# Patient Record
Sex: FEMALE | Race: WHITE | NOT HISPANIC OR LATINO | Employment: UNEMPLOYED | URBAN - METROPOLITAN AREA
[De-identification: names, ages, dates, MRNs, and addresses within clinical notes are randomized per-mention and may not be internally consistent; named-entity substitution may affect disease eponyms.]

---

## 2021-02-04 ENCOUNTER — OFFICE VISIT (OUTPATIENT)
Dept: OBSTETRICS AND GYNECOLOGY | Facility: CLINIC | Age: 51
End: 2021-02-04
Payer: COMMERCIAL

## 2021-02-04 VITALS
SYSTOLIC BLOOD PRESSURE: 118 MMHG | DIASTOLIC BLOOD PRESSURE: 64 MMHG | HEIGHT: 62 IN | WEIGHT: 190.19 LBS | BODY MASS INDEX: 35 KG/M2

## 2021-02-04 DIAGNOSIS — Z87.2 HISTORY OF CYST OF BREAST: ICD-10-CM

## 2021-02-04 DIAGNOSIS — Z01.419 ENCOUNTER FOR ANNUAL ROUTINE GYNECOLOGICAL EXAMINATION: Primary | ICD-10-CM

## 2021-02-04 DIAGNOSIS — N95.1 PERIMENOPAUSAL: ICD-10-CM

## 2021-02-04 DIAGNOSIS — Z12.31 BREAST CANCER SCREENING BY MAMMOGRAM: ICD-10-CM

## 2021-02-04 DIAGNOSIS — Z87.42 HISTORY OF OVARIAN CYST: ICD-10-CM

## 2021-02-04 DIAGNOSIS — Z12.11 COLON CANCER SCREENING: ICD-10-CM

## 2021-02-04 PROBLEM — E11.9 TYPE 2 DIABETES MELLITUS: Status: ACTIVE | Noted: 2021-02-04

## 2021-02-04 PROBLEM — I47.10 SUPRAVENTRICULAR TACHYCARDIA: Status: RESOLVED | Noted: 2021-02-04 | Resolved: 2021-02-04

## 2021-02-04 PROBLEM — E78.2 MIXED HYPERLIPIDEMIA: Status: ACTIVE | Noted: 2021-02-04

## 2021-02-04 PROBLEM — I05.9 MITRAL VALVE DISEASE: Status: ACTIVE | Noted: 2021-02-04

## 2021-02-04 PROBLEM — I10 ESSENTIAL HYPERTENSION: Status: ACTIVE | Noted: 2021-02-04

## 2021-02-04 PROBLEM — E03.9 HYPOTHYROIDISM: Status: ACTIVE | Noted: 2021-02-04

## 2021-02-04 PROBLEM — D64.9 ANEMIA: Status: ACTIVE | Noted: 2021-02-04

## 2021-02-04 PROBLEM — R63.8 INCREASED BODY MASS INDEX (BMI): Status: ACTIVE | Noted: 2021-02-04

## 2021-02-04 PROBLEM — I47.10 SUPRAVENTRICULAR TACHYCARDIA: Status: ACTIVE | Noted: 2021-02-04

## 2021-02-04 PROCEDURE — 88175 CYTOPATH C/V AUTO FLUID REDO: CPT

## 2021-02-04 PROCEDURE — 99999 PR PBB SHADOW E&M-NEW PATIENT-LVL III: ICD-10-PCS | Mod: PBBFAC,,, | Performed by: OBSTETRICS & GYNECOLOGY

## 2021-02-04 PROCEDURE — 1126F PR PAIN SEVERITY QUANTIFIED, NO PAIN PRESENT: ICD-10-PCS | Mod: S$GLB,,, | Performed by: OBSTETRICS & GYNECOLOGY

## 2021-02-04 PROCEDURE — 1126F AMNT PAIN NOTED NONE PRSNT: CPT | Mod: S$GLB,,, | Performed by: OBSTETRICS & GYNECOLOGY

## 2021-02-04 PROCEDURE — 99386 PREV VISIT NEW AGE 40-64: CPT | Mod: S$GLB,,, | Performed by: OBSTETRICS & GYNECOLOGY

## 2021-02-04 PROCEDURE — 99999 PR PBB SHADOW E&M-NEW PATIENT-LVL III: CPT | Mod: PBBFAC,,, | Performed by: OBSTETRICS & GYNECOLOGY

## 2021-02-04 PROCEDURE — 99386 PR PREVENTIVE VISIT,NEW,40-64: ICD-10-PCS | Mod: S$GLB,,, | Performed by: OBSTETRICS & GYNECOLOGY

## 2021-02-04 PROCEDURE — 3008F BODY MASS INDEX DOCD: CPT | Mod: CPTII,S$GLB,, | Performed by: OBSTETRICS & GYNECOLOGY

## 2021-02-04 PROCEDURE — 87624 HPV HI-RISK TYP POOLED RSLT: CPT

## 2021-02-04 PROCEDURE — 3008F PR BODY MASS INDEX (BMI) DOCUMENTED: ICD-10-PCS | Mod: CPTII,S$GLB,, | Performed by: OBSTETRICS & GYNECOLOGY

## 2021-02-04 RX ORDER — METOPROLOL SUCCINATE 100 MG/1
100 TABLET, EXTENDED RELEASE ORAL DAILY
COMMUNITY
Start: 2020-11-12

## 2021-02-04 RX ORDER — IRBESARTAN 300 MG/1
TABLET ORAL
COMMUNITY
Start: 2021-01-31

## 2021-02-04 RX ORDER — ATORVASTATIN CALCIUM 10 MG/1
10 TABLET, FILM COATED ORAL NIGHTLY
COMMUNITY
Start: 2020-11-12

## 2021-02-04 RX ORDER — AMLODIPINE BESYLATE 2.5 MG/1
2.5 TABLET ORAL DAILY
COMMUNITY
Start: 2020-11-24

## 2021-02-04 RX ORDER — METFORMIN HYDROCHLORIDE 500 MG/1
1000 TABLET ORAL 2 TIMES DAILY
COMMUNITY
Start: 2020-11-12

## 2021-02-04 RX ORDER — LEVOTHYROXINE SODIUM 25 UG/1
TABLET ORAL
COMMUNITY
Start: 2020-12-17

## 2021-02-04 RX ORDER — METOPROLOL SUCCINATE 50 MG/1
TABLET, EXTENDED RELEASE ORAL
COMMUNITY
Start: 2021-02-01

## 2021-02-04 RX ORDER — GLIPIZIDE 5 MG/1
5 TABLET, FILM COATED, EXTENDED RELEASE ORAL 2 TIMES DAILY
COMMUNITY
Start: 2020-11-12

## 2021-02-04 RX ORDER — IRON POLYSACCHARIDE COMPLEX 150 MG
1 CAPSULE ORAL DAILY
COMMUNITY
Start: 2020-11-04

## 2021-02-09 ENCOUNTER — HOSPITAL ENCOUNTER (OUTPATIENT)
Dept: RADIOLOGY | Facility: HOSPITAL | Age: 51
Discharge: HOME | End: 2021-02-09
Attending: OBSTETRICS & GYNECOLOGY
Payer: COMMERCIAL

## 2021-02-09 DIAGNOSIS — Z87.2 HISTORY OF CYST OF BREAST: ICD-10-CM

## 2021-02-09 DIAGNOSIS — Z12.31 BREAST CANCER SCREENING BY MAMMOGRAM: ICD-10-CM

## 2021-02-09 PROCEDURE — 76642 ULTRASOUND BREAST LIMITED: CPT | Mod: TC,50

## 2021-02-09 PROCEDURE — 77066 DX MAMMO INCL CAD BI: CPT | Mod: 26,,, | Performed by: RADIOLOGY

## 2021-02-09 PROCEDURE — 77062 BREAST TOMOSYNTHESIS BI: CPT | Mod: TC

## 2021-02-09 PROCEDURE — 77066 MAMMO DIGITAL DIAGNOSTIC BILAT WITH TOMO: ICD-10-PCS | Mod: 26,,, | Performed by: RADIOLOGY

## 2021-02-09 PROCEDURE — 77062 BREAST TOMOSYNTHESIS BI: CPT | Mod: 26,,, | Performed by: RADIOLOGY

## 2021-02-09 PROCEDURE — 76642 ULTRASOUND BREAST LIMITED: CPT | Mod: 26,,, | Performed by: RADIOLOGY

## 2021-02-09 PROCEDURE — 76642 US BREAST BILATERAL LIMITED: ICD-10-PCS | Mod: 26,,, | Performed by: RADIOLOGY

## 2021-02-09 PROCEDURE — 77062 MAMMO DIGITAL DIAGNOSTIC BILAT WITH TOMO: ICD-10-PCS | Mod: 26,,, | Performed by: RADIOLOGY

## 2021-02-10 LAB
HPV HR 12 DNA SPEC QL NAA+PROBE: NEGATIVE
HPV16 AG SPEC QL: NEGATIVE
HPV18 DNA SPEC QL NAA+PROBE: NEGATIVE

## 2021-03-02 LAB
FINAL PATHOLOGIC DIAGNOSIS: NORMAL
Lab: NORMAL

## 2021-03-03 LAB — NONINV COLON CA DNA+OCC BLD SCRN STL QL: NEGATIVE

## 2021-04-14 ENCOUNTER — TELEPHONE (OUTPATIENT)
Dept: OBSTETRICS AND GYNECOLOGY | Facility: CLINIC | Age: 51
End: 2021-04-14

## 2022-01-12 ENCOUNTER — LAB VISIT (OUTPATIENT)
Dept: PRIMARY CARE CLINIC | Facility: OTHER | Age: 52
End: 2022-01-12
Attending: INTERNAL MEDICINE
Payer: COMMERCIAL

## 2022-01-12 DIAGNOSIS — U07.1 COVID-19: Primary | ICD-10-CM

## 2022-01-12 LAB
CTP QC/QA: YES
SARS-COV-2 AG RESP QL IA.RAPID: NEGATIVE

## 2022-01-12 PROCEDURE — 87811 SARS-COV-2 COVID19 W/OPTIC: CPT

## 2022-01-12 NOTE — PROGRESS NOTES
Nasal specimen collected.   BinaxNOW test performed in the presence of patient and results loaded into EPIC. Pt instructed with following instructions:.  Instructions for Patients with Confirmed or Suspected COVID-19    If you are awaiting your test result, you will either be called or it will be released to the patient portal.  If you have any questions about your test, please visit [email or web address] or call our COVID-19 information line at [phone].      Please isolate yourself at home.  You may leave home and/or return to work once the following conditions are met:    If you were not hospitalized and are not severely immunocompromised*:   More than 10 days since symptoms first appeared AND   More than 24 hours fever free without medications AND   Symptoms have improved     If you were hospitalized OR are severely immunocompromised*:   More than 20 days since symptoms first appeared   More than 24 hours fever free without medications   Symptoms have improved    If you had no symptoms but tested positive:   More than 10 days since the date of the first positive test (20 days if severely immunocompromised).   If you develop symptoms, then use the guidelines above.     *Definition of severely immunocompromised:  - Current chemotherapy for cancer  - Untreated HIV with CD4 count less than 200  - Combined primary immunodeficiency disorder  - Prednisone more than 20 mg per day for more than 14 days  - Post-transplant patients

## 2022-03-25 ENCOUNTER — OFFICE VISIT (OUTPATIENT)
Dept: OBSTETRICS AND GYNECOLOGY | Facility: CLINIC | Age: 52
End: 2022-03-25
Payer: COMMERCIAL

## 2022-03-25 VITALS
BODY MASS INDEX: 35.41 KG/M2 | WEIGHT: 192.44 LBS | HEIGHT: 62 IN | SYSTOLIC BLOOD PRESSURE: 132 MMHG | DIASTOLIC BLOOD PRESSURE: 78 MMHG

## 2022-03-25 DIAGNOSIS — Z12.31 BREAST CANCER SCREENING BY MAMMOGRAM: ICD-10-CM

## 2022-03-25 DIAGNOSIS — D64.9 ANEMIA, UNSPECIFIED TYPE: ICD-10-CM

## 2022-03-25 DIAGNOSIS — Z01.419 ENCOUNTER FOR ANNUAL ROUTINE GYNECOLOGICAL EXAMINATION: Primary | ICD-10-CM

## 2022-03-25 DIAGNOSIS — Z87.898 HISTORY OF ABNORMAL MAMMOGRAM: ICD-10-CM

## 2022-03-25 DIAGNOSIS — N76.1 SUBACUTE VAGINITIS: ICD-10-CM

## 2022-03-25 PROCEDURE — 3078F PR MOST RECENT DIASTOLIC BLOOD PRESSURE < 80 MM HG: ICD-10-PCS | Mod: CPTII,S$GLB,, | Performed by: OBSTETRICS & GYNECOLOGY

## 2022-03-25 PROCEDURE — 99999 PR PBB SHADOW E&M-EST. PATIENT-LVL IV: ICD-10-PCS | Mod: PBBFAC,,, | Performed by: OBSTETRICS & GYNECOLOGY

## 2022-03-25 PROCEDURE — 99999 PR PBB SHADOW E&M-EST. PATIENT-LVL IV: CPT | Mod: PBBFAC,,, | Performed by: OBSTETRICS & GYNECOLOGY

## 2022-03-25 PROCEDURE — 4010F ACE/ARB THERAPY RXD/TAKEN: CPT | Mod: CPTII,S$GLB,, | Performed by: OBSTETRICS & GYNECOLOGY

## 2022-03-25 PROCEDURE — 4010F PR ACE/ARB THEARPY RXD/TAKEN: ICD-10-PCS | Mod: CPTII,S$GLB,, | Performed by: OBSTETRICS & GYNECOLOGY

## 2022-03-25 PROCEDURE — 87481 CANDIDA DNA AMP PROBE: CPT | Mod: 59 | Performed by: OBSTETRICS & GYNECOLOGY

## 2022-03-25 PROCEDURE — 3008F PR BODY MASS INDEX (BMI) DOCUMENTED: ICD-10-PCS | Mod: CPTII,S$GLB,, | Performed by: OBSTETRICS & GYNECOLOGY

## 2022-03-25 PROCEDURE — 3078F DIAST BP <80 MM HG: CPT | Mod: CPTII,S$GLB,, | Performed by: OBSTETRICS & GYNECOLOGY

## 2022-03-25 PROCEDURE — 1159F MED LIST DOCD IN RCRD: CPT | Mod: CPTII,S$GLB,, | Performed by: OBSTETRICS & GYNECOLOGY

## 2022-03-25 PROCEDURE — 3075F SYST BP GE 130 - 139MM HG: CPT | Mod: CPTII,S$GLB,, | Performed by: OBSTETRICS & GYNECOLOGY

## 2022-03-25 PROCEDURE — 3008F BODY MASS INDEX DOCD: CPT | Mod: CPTII,S$GLB,, | Performed by: OBSTETRICS & GYNECOLOGY

## 2022-03-25 PROCEDURE — 1159F PR MEDICATION LIST DOCUMENTED IN MEDICAL RECORD: ICD-10-PCS | Mod: CPTII,S$GLB,, | Performed by: OBSTETRICS & GYNECOLOGY

## 2022-03-25 PROCEDURE — 99396 PREV VISIT EST AGE 40-64: CPT | Mod: S$GLB,,, | Performed by: OBSTETRICS & GYNECOLOGY

## 2022-03-25 PROCEDURE — 3075F PR MOST RECENT SYSTOLIC BLOOD PRESS GE 130-139MM HG: ICD-10-PCS | Mod: CPTII,S$GLB,, | Performed by: OBSTETRICS & GYNECOLOGY

## 2022-03-25 PROCEDURE — 87801 DETECT AGNT MULT DNA AMPLI: CPT | Performed by: OBSTETRICS & GYNECOLOGY

## 2022-03-25 PROCEDURE — 99396 PR PREVENTIVE VISIT,EST,40-64: ICD-10-PCS | Mod: S$GLB,,, | Performed by: OBSTETRICS & GYNECOLOGY

## 2022-03-25 RX ORDER — FLUCONAZOLE 150 MG/1
TABLET ORAL
COMMUNITY
Start: 2022-03-16 | End: 2022-05-04

## 2022-03-25 RX ORDER — GLIPIZIDE 5 MG/1
5 TABLET, FILM COATED, EXTENDED RELEASE ORAL
COMMUNITY
Start: 2021-04-27

## 2022-03-25 RX ORDER — METRONIDAZOLE 500 MG/1
500 TABLET ORAL 2 TIMES DAILY
COMMUNITY
Start: 2022-02-10 | End: 2022-05-04

## 2022-03-25 RX ORDER — CELECOXIB 200 MG/1
200 CAPSULE ORAL DAILY
COMMUNITY
Start: 2022-03-01

## 2022-03-25 NOTE — PATIENT INSTRUCTIONS
Please check out the [employer] PATIENT WEBSITE.  The site has education materials, patient stories, expert views, and a portal for you to ask questions.       [email or web address]      As always, please let me know if you have any questions.     Dr. Anita Moran

## 2022-03-25 NOTE — PROGRESS NOTES
Chief Complaint: Well Woman Exam     HPI:      Alcira Craft is a 51 y.o.  who presents today for well woman exam.  LMP: Patient's last menstrual period was 2021.  No issues, problems, or complaints. Specifically, patient denies abnormal vaginal bleeding, discharge, pelvic pain, urinary problems, or changes in appetite.    Does report history of recurrent yeast infections over the past few months. Ms. Craft is currently sexually active with a single male partner. She is currently using bilateral tubal ligation for contraception. She declines STD screening today.    Previous Pap:  no abnormalities (3/2/2021)  Previous Mammogram:    Impression:     1.  Oval hypoechoic circumscribed mass in the RIGHT breast at the 10:00 position which is probably benign.  Recommend short term follow-up in 12 months when the patient will be due for annual imaging.     2.  Focal asymmetry in the lower inner central LEFT breast which is probably benign.  Recommend short term follow-up in 12 months when the patient will be due for annual imaging.     BI-RADS Category:   Overall: 3 - Probably Benign     Recommendation:  Short interval follow-up is recommended in 12 Months.        Your estimated lifetime risk of breast cancer (to age 85) based on Tyrer-Cuzick risk assessment model is Tyrer-Cuzick: 8.15 %. According to the American Cancer Society, patients with a lifetime breast cancer risk of 20% or higher might benefit from supplemental screening tests.                   Exam Ended: 21 13:20 Last Resulted: 21 14:42             Most Recent Dexa: n/a  Colon cancer screening: Cologuard  negative    COVID: Completed + booster  Flu: Allergic  Gardasil:has never had     Patient Active Problem List   Diagnosis    Essential hypertension    Anemia    Hypothyroidism    Increased body mass index (BMI)    Mitral valve disease    Mixed hyperlipidemia    Type 2 diabetes mellitus       Past Medical History:  "  Diagnosis Date    Anemia     Diabetes mellitus     Hyperlipidemia     Hypertension     Supraventricular tachycardia 2021       Past Surgical History:   Procedure Laterality Date     SECTION      x3    LAPAROSCOPIC GASTRIC BANDING      27 yrs ago        OB History        3    Para   3    Term   3            AB        Living   3       SAB        IAB        Ectopic        Multiple        Live Births   3                 ROS:     Review of Systems   Constitutional: Negative for activity change, fatigue and unexpected weight change.   Respiratory: Negative for shortness of breath.    Cardiovascular: Negative for chest pain.   Gastrointestinal: Negative for abdominal pain, blood in stool, constipation and diarrhea.   Endocrine: Negative for cold intolerance and heat intolerance.   Genitourinary: Negative for bladder incontinence, dyspareunia, dysuria, frequency, hot flashes, vaginal bleeding and vaginal dryness.   Integumentary:  Negative for breast mass, breast discharge and breast tenderness.   Psychiatric/Behavioral: Negative for dysphoric mood. The patient is not nervous/anxious.    Breast: Negative for mass and tenderness      Physical Exam:      PHYSICAL EXAM:  /78   Ht 5' 2" (1.575 m)   Wt 87.3 kg (192 lb 7.4 oz)   LMP 2021   BMI 35.20 kg/m²   Body mass index is 35.2 kg/m².     APPEARANCE: Well nourished, well developed, in no acute distress.  PSYCH: Appropriate mood and affect.  SKIN: No acne or hirsutism  NECK: Neck symmetric without masses or thyromegaly  NODES: No inguinal, axillary, or supraclavicular lymph node enlargement  ABDOMEN: Soft.  No tenderness or masses.    CARDIOVASCULAR: No edema of peripheral extremities  BREASTS: Symmetrical, no skin changes or visible lesions.  No palpable masses or nipple discharge bilaterally.  PELVIC: Normal external genitalia without lesions.  Normal hair distribution.  Adequate perineal body, normal urethral meatus.  Vagina " moist and well rugated without lesions or discharge.  Cervix pink, without lesions, discharge or tenderness.  No significant cystocele or rectocele.  Bimanual exam shows uterus to be normal size, regular, mobile and nontender.  Adnexa without masses or tenderness.      Assessment:     1. Encounter for annual routine gynecological examination     2. Breast cancer screening by mammogram     3. Subacute vaginitis  Vaginosis Screen by DNA Probe   4. History of abnormal mammogram  Mammo Digital Diagnostic Bilat with Altaf    US Breast Bilateral Limited    Mammo Digital Diagnostic Bilat with Altaf    US Breast Bilateral Limited    CANCELED: US Breast Bilateral Limited    CANCELED: Mammo Digital Diagnostic Bilat with Altaf   5. Anemia, unspecified type           Plan:     1. Clinical breast exam performed.  2. Pap UTD.  3. Mammogram and US ordered. Patient requested imaging at [facility].   4. DEXA at 63.  5. Colonoscopy schedule due to anemia.  6. Recurrent vaginitis - Affirm.   Follow up in about 1 year (around 3/25/2023) for annual well woman exam or as needed.    Counseling:     Patient was counseled today on current ASCCP pap guidelines, the recommendation for yearly pelvic exams, healthy diet and exercise routines, breast self awareness and annual mammograms.She is to see her PCP for other health maintenance.     Use of the [facility] Patient Portal discussed and encouraged during today's visit.         Anita Moran MD  [facility]  03/25/2022

## 2022-03-29 LAB
BACTERIAL VAGINOSIS DNA: NEGATIVE
CANDIDA GLABRATA DNA: NEGATIVE
CANDIDA KRUSEI DNA: NEGATIVE
CANDIDA RRNA VAG QL PROBE: NEGATIVE
T VAGINALIS RRNA GENITAL QL PROBE: NEGATIVE

## 2022-04-21 ENCOUNTER — TELEPHONE (OUTPATIENT)
Dept: OBSTETRICS AND GYNECOLOGY | Facility: CLINIC | Age: 52
End: 2022-04-21
Payer: COMMERCIAL

## 2022-04-21 NOTE — TELEPHONE ENCOUNTER
----- Message from Tracy Oviedo sent at 4/21/2022 11:47 AM CDT -----  Type:  Needs Medical Advice    Who Called: pt  Symptoms (please be specific): pt has a possible yeast infection   How long has patient had these symptoms:  3 days  Pharmacy name and phone #:  [facility] [address] [place] [address] [address] [facility]   Phone: [phone]  Fax:  [phone]        Would the patient rather a call back or a response via [facility]? call  Best Call Back Number: [phone]  Additional Information:

## 2022-04-21 NOTE — TELEPHONE ENCOUNTER
Returned patient phone call.   Sent symptoms to provider. Let patient know I will be in touch once the provider gets back to me. Patient expressed understanding.

## 2022-04-21 NOTE — TELEPHONE ENCOUNTER
----- Message from Marianela Fowler MA sent at 4/21/2022 12:06 PM CDT -----  I think Elaine ment to send this back to you   ----- Message -----  From: Ashwini Barrera NP  Sent: 4/21/2022  11:59 AM CDT  To: Bruce Maradiaga S Staff    Offer appt if she likes, otherwise try Monistat 7 for the itching.  ----- Message -----  From: Bess Winchester MA  Sent: 4/21/2022  11:56 AM CDT  To: Ashwini Barrera NP    Dryness  Iching  Very little discharge ( grainy )    Took medication( paxlovid ) after having covid  - last Thursday diagnosis     Does patient need to come in or are you able to send in medication?    ----- Message -----  From: Tracy Oviedo  Sent: 4/21/2022  11:49 AM CDT  To: Dean Howard Staff    Type:  Needs Medical Advice    Who Called: pt  Symptoms (please be specific): pt has a possible yeast infection   How long has patient had these symptoms:  3 days  Pharmacy name and phone #:  [facility] [address] [place] [address] [address] [facility]   Phone: [phone]  Fax:  [phone]        Would the patient rather a call back or a response via [facility]? call  Best Call Back Number: [phone]  Additional Information:

## 2022-04-21 NOTE — TELEPHONE ENCOUNTER
Let patient know the providers recommendations. Provider opted to get otc medication . Declined comin in for a visit.

## 2022-05-03 ENCOUNTER — TELEPHONE (OUTPATIENT)
Dept: OBSTETRICS AND GYNECOLOGY | Facility: CLINIC | Age: 52
End: 2022-05-03
Payer: COMMERCIAL

## 2022-05-03 NOTE — TELEPHONE ENCOUNTER
----- Message from Pieter Escalera sent at 5/3/2022  8:54 AM CDT -----  Contact: pt.  .Type:  Needs Medical Advice    Who Called: pt  Symptoms (please be specific): rash in between her legs and outer vagina area from a UTI  How long has patient had these symptoms:    Pharmacy name and phone #:  [facility] [address] [place] [address] [address] [facility]  Phone: [phone]  Fax:  [phone]  Would the patient rather a call back or a response via [facility]? Call back  Best Call Back Number: [phone]  Additional Information: Would like something call in for her and speak with someone from the office.

## 2022-05-03 NOTE — TELEPHONE ENCOUNTER
Called to get patient scheduled due to vaginal rash caused by yeast. Patient expressed understanding that it was best for her to come in and be seen since the issue with yeast is persistent.

## 2022-05-04 ENCOUNTER — OFFICE VISIT (OUTPATIENT)
Dept: OBSTETRICS AND GYNECOLOGY | Facility: CLINIC | Age: 52
End: 2022-05-04
Payer: COMMERCIAL

## 2022-05-04 VITALS
BODY MASS INDEX: 34.48 KG/M2 | HEIGHT: 62 IN | SYSTOLIC BLOOD PRESSURE: 135 MMHG | WEIGHT: 187.38 LBS | DIASTOLIC BLOOD PRESSURE: 78 MMHG

## 2022-05-04 DIAGNOSIS — L29.2 VULVAR ITCHING: ICD-10-CM

## 2022-05-04 DIAGNOSIS — B37.2 INTERTRIGINOUS CANDIDIASIS: Primary | ICD-10-CM

## 2022-05-04 PROCEDURE — 4010F PR ACE/ARB THEARPY RXD/TAKEN: ICD-10-PCS | Mod: CPTII,S$GLB,, | Performed by: NURSE PRACTITIONER

## 2022-05-04 PROCEDURE — 99999 PR PBB SHADOW E&M-EST. PATIENT-LVL III: ICD-10-PCS | Mod: PBBFAC,,, | Performed by: NURSE PRACTITIONER

## 2022-05-04 PROCEDURE — 3075F PR MOST RECENT SYSTOLIC BLOOD PRESS GE 130-139MM HG: ICD-10-PCS | Mod: CPTII,S$GLB,, | Performed by: NURSE PRACTITIONER

## 2022-05-04 PROCEDURE — 99212 PR OFFICE/OUTPT VISIT, EST, LEVL II, 10-19 MIN: ICD-10-PCS | Mod: S$GLB,,, | Performed by: NURSE PRACTITIONER

## 2022-05-04 PROCEDURE — 99212 OFFICE O/P EST SF 10 MIN: CPT | Mod: S$GLB,,, | Performed by: NURSE PRACTITIONER

## 2022-05-04 PROCEDURE — 1159F MED LIST DOCD IN RCRD: CPT | Mod: CPTII,S$GLB,, | Performed by: NURSE PRACTITIONER

## 2022-05-04 PROCEDURE — 99999 PR PBB SHADOW E&M-EST. PATIENT-LVL III: CPT | Mod: PBBFAC,,, | Performed by: NURSE PRACTITIONER

## 2022-05-04 PROCEDURE — 3008F BODY MASS INDEX DOCD: CPT | Mod: CPTII,S$GLB,, | Performed by: NURSE PRACTITIONER

## 2022-05-04 PROCEDURE — 1160F PR REVIEW ALL MEDS BY PRESCRIBER/CLIN PHARMACIST DOCUMENTED: ICD-10-PCS | Mod: CPTII,S$GLB,, | Performed by: NURSE PRACTITIONER

## 2022-05-04 PROCEDURE — 1160F RVW MEDS BY RX/DR IN RCRD: CPT | Mod: CPTII,S$GLB,, | Performed by: NURSE PRACTITIONER

## 2022-05-04 PROCEDURE — 3078F PR MOST RECENT DIASTOLIC BLOOD PRESSURE < 80 MM HG: ICD-10-PCS | Mod: CPTII,S$GLB,, | Performed by: NURSE PRACTITIONER

## 2022-05-04 PROCEDURE — 3075F SYST BP GE 130 - 139MM HG: CPT | Mod: CPTII,S$GLB,, | Performed by: NURSE PRACTITIONER

## 2022-05-04 PROCEDURE — 3008F PR BODY MASS INDEX (BMI) DOCUMENTED: ICD-10-PCS | Mod: CPTII,S$GLB,, | Performed by: NURSE PRACTITIONER

## 2022-05-04 PROCEDURE — 3078F DIAST BP <80 MM HG: CPT | Mod: CPTII,S$GLB,, | Performed by: NURSE PRACTITIONER

## 2022-05-04 PROCEDURE — 87481 CANDIDA DNA AMP PROBE: CPT | Mod: 59 | Performed by: NURSE PRACTITIONER

## 2022-05-04 PROCEDURE — 4010F ACE/ARB THERAPY RXD/TAKEN: CPT | Mod: CPTII,S$GLB,, | Performed by: NURSE PRACTITIONER

## 2022-05-04 PROCEDURE — 1159F PR MEDICATION LIST DOCUMENTED IN MEDICAL RECORD: ICD-10-PCS | Mod: CPTII,S$GLB,, | Performed by: NURSE PRACTITIONER

## 2022-05-04 RX ORDER — NYSTATIN 100000 [USP'U]/G
POWDER TOPICAL 2 TIMES DAILY
Qty: 60 G | Refills: 1 | Status: SHIPPED | OUTPATIENT
Start: 2022-05-04 | End: 2022-05-30 | Stop reason: SDUPTHER

## 2022-05-04 RX ORDER — CLOTRIMAZOLE AND BETAMETHASONE DIPROPIONATE 10; .64 MG/G; MG/G
CREAM TOPICAL
Qty: 15 G | Refills: 1 | Status: SHIPPED | OUTPATIENT
Start: 2022-05-04 | End: 2023-05-04

## 2022-05-04 RX ORDER — NIRMATRELVIR AND RITONAVIR 300-100 MG
KIT ORAL
COMMUNITY
Start: 2022-04-14

## 2022-05-04 RX ORDER — ALBUTEROL SULFATE 0.83 MG/ML
2.5 SOLUTION RESPIRATORY (INHALATION) EVERY 6 HOURS PRN
COMMUNITY
Start: 2022-04-14

## 2022-05-04 RX ORDER — AZITHROMYCIN 250 MG/1
TABLET, FILM COATED ORAL
COMMUNITY
Start: 2022-04-14 | End: 2022-05-04

## 2022-05-04 RX ORDER — FLUTICASONE PROPIONATE 50 MCG
2 SPRAY, SUSPENSION (ML) NASAL DAILY
COMMUNITY
Start: 2022-04-04

## 2022-05-04 NOTE — PROGRESS NOTES
Alcira Craft is a 51 y.o. female  presents with complaint of ongoing vulvar itching and yeast infections. New to me- sees Dr. Moran. Symptoms started in Feb after a trip to [facility] [facility]. She and her  have taken multiple doses of diflucan. Symptoms improve but then return. They are both diabetic. She has been avoiding intercourse the last month due to symptoms. No vaginal odor. Reports itching and redness in skin folds under breasts and pannus. Affirm done in March was negative.  has been treated for yeast with a cream.     Past Medical History:   Diagnosis Date    Anemia     Diabetes mellitus     Hyperlipidemia     Hypertension     Supraventricular tachycardia 2021     Past Surgical History:   Procedure Laterality Date     SECTION      x3    LAPAROSCOPIC GASTRIC BANDING      27 yrs ago      Social History     Tobacco Use    Smoking status: Never Smoker    Smokeless tobacco: Never Used   Substance Use Topics    Alcohol use: Yes     Comment: occasional     Drug use: Never     Family History   Problem Relation Age of Onset    Diabetes Mother     Hypertension Mother     Breast cancer Neg Hx     Ovarian cancer Neg Hx     Colon cancer Neg Hx      OB History    Para Term  AB Living   3 3 3     3   SAB IAB Ectopic Multiple Live Births           3      # Outcome Date GA Lbr Carlitos/2nd Weight Sex Delivery Anes PTL Lv   3 Term     F CS-LTranv   ANTONY   2 Term     M CS-LTranv   ANTONY   1 Term     F CS-LTranv   ANTONY       Underlying medical conditions (diabetes, HIV, IBS)-  diabetes  Relation of symptoms to menses- na  Self treatment- Monistat 7, Flagyl, multiple rounds of Diflucan  Vulvovaginal hygiene practices- no douching, uses Dove soap  Sexual history (number of partners, gender)- male, one partner, avoiding intercourse x 1 mo    ROS:  GENERAL: No fever, chills, fatigability or weight loss.  VULVAR: No pain, no lesions and + itching.  VAGINAL: No  relaxation, no itching, no discharge, no abnormal bleeding and no lesions.  ABDOMEN: No abdominal pain. Denies nausea. Denies vomiting. No diarrhea. No constipation  BREAST: Denies pain. No lumps. No discharge.  URINARY: No incontinence, no nocturia, no frequency and no dysuria.  CARDIOVASCULAR: No chest pain. No shortness of breath. No leg cramps.  NEUROLOGICAL: No headaches. No vision changes.    PHYSICAL EXAM:  VULVA: normal appearing vulva with no masses, tenderness or lesions   VAGINA: normal appearing vagina with normal color. Small amount of thin white discharge, no lesions   + redness and yeast like appearance under pannus, skin is moist  CERVIX: normal appearing cervix without discharge or lesions   UTERUS: uterus is normal size, shape, consistency and nontender   ADNEXA: normal adnexa in size, nontender and no masses    ASSESSMENT and PLAN:    ICD-10-CM ICD-9-CM    1. Intertriginous candidiasis  B37.2 112.3 nystatin (MYCOSTATIN) powder   2. Vulvar itching  L29.2 698.1 clotrimazole-betamethasone 1-0.05% (LOTRISONE) cream      Vaginosis Screen by DNA Probe     1. Affirm pending, rx Lotrisone today for vulvar itching  2. Rx Nystatin powder daily to skin folds.  OTC alternative is Zeasorb    Patient was counseled today on vaginitis prevention including :  a. avoiding feminine products such as deoderant soaps, body wash, bubble bath, douches, scented toilet paper, deoderant tampons or pads, feminine wipes, chronic pad use, etc.  b. avoiding other vulvovaginal irritants such as long hot baths, humidity, tight, synthetic clothing, chlorine and sitting around in wet bathing suits  c. wearing cotton underwear, avoiding thong underwear and no underwear to bed  d. taking showers instead of baths and use a hair dryer on cool setting afterwards to dry  e. wearing cotton to exercise and shower immediately after exercise and change clothes  f. using polyurethane condoms without spermicide if sexually active and symptoms  are triggered by intercourse    Discussed avoiding hot tubs and sexual intercourse until symptoms resolved. May want to consider weekly Diflucan x 6 months    FOLLOW UP: PRN lack of improvement.

## 2022-05-23 ENCOUNTER — HOSPITAL ENCOUNTER (OUTPATIENT)
Dept: RADIOLOGY | Facility: HOSPITAL | Age: 52
Discharge: HOME | End: 2022-05-23
Attending: OBSTETRICS & GYNECOLOGY
Payer: COMMERCIAL

## 2022-05-23 VITALS — HEIGHT: 62 IN | BODY MASS INDEX: 34.96 KG/M2 | WEIGHT: 190 LBS

## 2022-05-23 DIAGNOSIS — Z87.898 HISTORY OF ABNORMAL MAMMOGRAM: ICD-10-CM

## 2022-05-23 PROCEDURE — 77062 MAMMO DIGITAL DIAGNOSTIC BILAT WITH TOMO: ICD-10-PCS | Mod: 26,,, | Performed by: RADIOLOGY

## 2022-05-23 PROCEDURE — 76642 ULTRASOUND BREAST LIMITED: CPT | Mod: TC,RT

## 2022-05-23 PROCEDURE — 77066 MAMMO DIGITAL DIAGNOSTIC BILAT WITH TOMO: ICD-10-PCS | Mod: 26,,, | Performed by: RADIOLOGY

## 2022-05-23 PROCEDURE — 77062 BREAST TOMOSYNTHESIS BI: CPT | Mod: 26,,, | Performed by: RADIOLOGY

## 2022-05-23 PROCEDURE — 77066 DX MAMMO INCL CAD BI: CPT | Mod: TC

## 2022-05-23 PROCEDURE — 76642 US BREAST RIGHT LIMITED: ICD-10-PCS | Mod: 26,RT,, | Performed by: RADIOLOGY

## 2022-05-23 PROCEDURE — 76642 ULTRASOUND BREAST LIMITED: CPT | Mod: 26,RT,, | Performed by: RADIOLOGY

## 2022-05-23 PROCEDURE — 77066 DX MAMMO INCL CAD BI: CPT | Mod: 26,,, | Performed by: RADIOLOGY

## 2022-05-29 ENCOUNTER — PATIENT MESSAGE (OUTPATIENT)
Dept: OBSTETRICS AND GYNECOLOGY | Facility: CLINIC | Age: 52
End: 2022-05-29
Payer: COMMERCIAL

## 2022-05-30 DIAGNOSIS — B37.2 INTERTRIGINOUS CANDIDIASIS: ICD-10-CM

## 2022-05-30 RX ORDER — NYSTATIN 100000 [USP'U]/G
POWDER TOPICAL 2 TIMES DAILY
Qty: 60 G | Refills: 1 | Status: SHIPPED | OUTPATIENT
Start: 2022-05-30

## 2023-03-30 ENCOUNTER — OFFICE VISIT (OUTPATIENT)
Dept: OBSTETRICS AND GYNECOLOGY | Facility: CLINIC | Age: 53
End: 2023-03-30
Payer: COMMERCIAL

## 2023-03-30 VITALS
BODY MASS INDEX: 34.08 KG/M2 | WEIGHT: 185.19 LBS | DIASTOLIC BLOOD PRESSURE: 76 MMHG | HEIGHT: 62 IN | SYSTOLIC BLOOD PRESSURE: 130 MMHG

## 2023-03-30 DIAGNOSIS — Z12.31 BREAST CANCER SCREENING BY MAMMOGRAM: Primary | ICD-10-CM

## 2023-03-30 DIAGNOSIS — Z01.419 ENCOUNTER FOR ANNUAL ROUTINE GYNECOLOGICAL EXAMINATION: ICD-10-CM

## 2023-03-30 PROBLEM — E66.9 OBESITY (BMI 30.0-34.9): Status: ACTIVE | Noted: 2021-05-05

## 2023-03-30 PROBLEM — E66.811 OBESITY (BMI 30.0-34.9): Status: ACTIVE | Noted: 2021-05-05

## 2023-03-30 PROBLEM — Z98.84 HISTORY OF LAPAROSCOPIC ADJUSTABLE GASTRIC BANDING: Status: ACTIVE | Noted: 2021-05-05

## 2023-03-30 PROBLEM — Z98.84 HISTORY OF LAPAROSCOPIC ADJUSTABLE GASTRIC BANDING: Status: RESOLVED | Noted: 2021-05-05 | Resolved: 2023-03-30

## 2023-03-30 PROBLEM — E66.3 OVERWEIGHT: Status: ACTIVE | Noted: 2021-05-05

## 2023-03-30 PROBLEM — D50.8 IRON DEFICIENCY ANEMIA SECONDARY TO INADEQUATE DIETARY IRON INTAKE: Status: ACTIVE | Noted: 2021-02-04

## 2023-03-30 PROCEDURE — 99999 PR PBB SHADOW E&M-EST. PATIENT-LVL III: ICD-10-PCS | Mod: PBBFAC,,, | Performed by: OBSTETRICS & GYNECOLOGY

## 2023-03-30 PROCEDURE — 99999 PR PBB SHADOW E&M-EST. PATIENT-LVL III: CPT | Mod: PBBFAC,,, | Performed by: OBSTETRICS & GYNECOLOGY

## 2023-03-30 PROCEDURE — 99396 PR PREVENTIVE VISIT,EST,40-64: ICD-10-PCS | Mod: S$GLB,,, | Performed by: OBSTETRICS & GYNECOLOGY

## 2023-03-30 PROCEDURE — 99396 PREV VISIT EST AGE 40-64: CPT | Mod: S$GLB,,, | Performed by: OBSTETRICS & GYNECOLOGY

## 2023-03-30 RX ORDER — ORAL SEMAGLUTIDE 3 MG/1
3 TABLET ORAL DAILY
COMMUNITY

## 2023-03-30 RX ORDER — BIOTIN 10 MG
TABLET ORAL
COMMUNITY

## 2023-03-30 RX ORDER — ERGOCALCIFEROL 1.25 MG/1
50000 CAPSULE ORAL
COMMUNITY

## 2023-03-30 NOTE — PROGRESS NOTES
Chief Complaint: Well Woman Exam     HPI:      Alcira Craft is a 52 y.o.  who presents today for well woman exam.  LMP: No LMP recorded. Patient is perimenopausal.  No issues, problems, or complaints. Specifically, patient denies abnormal vaginal bleeding, discharge, pelvic pain, urinary problems, or changes in appetite. Ms. Craft is currently sexually active with a single male partner. She is currently using no method for contraception. She declines STD screening today.    Previous Pap:  no abnormalities (3/2/2021)  Previous Mammogram:   Assessment    Overall   2 - Benign     Mammography Recommendations     Side Due   Routine Screening Mammogram in 1 year  2023       Breast Density     Overall   Breast Composition b - Scattered fibroglandular density     Details    Reading Physician Reading Date Result Priority   Arlyn Garsia MD  [phone] 2022 Routine     Physician Responsible for MQSA Outcome Reason    Arlyn Garsia MD Signed      Narrative & Impression  Result:   Mammo Digital Diagnostic Bilat with Altaf  US Breast Right Limited     History:  Patient is 51 y.o. and is seen for diagnostic imaging. Patient presents for short term follow-up of bilateral probably benign findings which were initially noted in .     Films Compared:  Prior images (if available) were compared.     Findings:  This procedure was performed using tomosynthesis. Computer-aided detection was utilized in the interpretation of this examination.     Bilateral diagnostic mammogram:  The breasts have scattered areas of fibroglandular density.      The previously seen oval equal density mass with circumscribed margins in the upper outer right breast at middle depth is no longer visualized, likely a cyst which has resolved.       No suspicious abnormality in the right breast.      There is a 5 mm focal asymmetry in the lower inner central left breast at anterior depth which is stable from priors from   and likely 2018, a benign finding given appearance and long term stability.       No suspicious abnormality in the left breast.     Right breast ultrasound:  Targeted ultrasound of the right breast at the 10:00 position, 8 cm from the nipple was performed.  The previously seen mass is no longer identified, likely a cyst which has resolved, a benign finding.        Impression:  Bilateral  There is no mammographic or sonographic evidence of malignancy.     BI-RADS Category:   Overall: 2 - Benign     Recommendation:  Routine screening mammogram in 1 year is recommended.        Your estimated lifetime risk of breast cancer (to age 85) based on Tyrer-Cuzick risk assessment model is Tyrer-Cuzick: 5.82 %. According to the American Cancer Society, patients with a lifetime breast cancer risk of 20% or higher might benefit from supplemental screening tests.             Exam Ended: 22 09:21 Last Resulted: 22 10:08            Most Recent Dexa: not indicated    BMI: 33.9  The ten year probability of fracture (%) without BMD    Major osteoporotic  4.2    Hip Fracture  0.2    Colonoscopy: , repeat 10 years    COVID vaccine: completed series  Gardasil:has never had     Patient Active Problem List   Diagnosis    Essential hypertension    Iron deficiency anemia secondary to inadequate dietary iron intake    Hypothyroidism    Increased body mass index    Mitral valve disorder    Mixed hyperlipidemia    Supraventricular tachycardia    Type 2 diabetes mellitus    Obesity (BMI 30.0-34.9)       Past Medical History:   Diagnosis Date    Anemia     Diabetes mellitus     History of laparoscopic adjustable gastric banding 2021    Hyperlipidemia     Hypertension     Supraventricular tachycardia 2021       Past Surgical History:   Procedure Laterality Date     SECTION      x3    LAPAROSCOPIC GASTRIC BANDING      27 yrs ago     LAPAROSCOPIC REMOVAL OF GASTRIC BAND  2022       OB History         "  3    Para   3    Term   3            AB        Living   3         SAB        IAB        Ectopic        Multiple        Live Births   3           Obstetric Comments   /R/4-5  No abnormal pap  No STDs  CD x 3  Menopause at 50               ROS:     Review of Systems   Constitutional:  Negative for activity change, fatigue and unexpected weight change.   Respiratory:  Negative for shortness of breath.    Cardiovascular:  Negative for chest pain.   Gastrointestinal:  Negative for abdominal pain, blood in stool, constipation and diarrhea.   Endocrine: Negative for cold intolerance and heat intolerance.   Genitourinary:  Negative for bladder incontinence, dyspareunia, dysuria, frequency, hot flashes, vaginal bleeding and vaginal dryness.   Integumentary:  Negative for breast mass, breast discharge and breast tenderness.   Psychiatric/Behavioral:  Negative for dysphoric mood. The patient is not nervous/anxious.    Breast: Negative for mass and tenderness    Physical Exam:      PHYSICAL EXAM:  /76   Ht 5' 2" (1.575 m)   Wt 84 kg (185 lb 3 oz)   BMI 33.87 kg/m²   Body mass index is 33.87 kg/m².     APPEARANCE: Well nourished, well developed, in no acute distress.  PSYCH: Appropriate mood and affect.  SKIN: No acne or hirsutism  NECK: Neck symmetric without masses or thyromegaly  NODES: No inguinal, axillary, or supraclavicular lymph node enlargement  ABDOMEN: Soft.  No tenderness or masses.    CARDIOVASCULAR: No edema of peripheral extremities  BREASTS: Symmetrical, no skin changes or visible lesions.  No palpable masses or nipple discharge bilaterally.  PELVIC: Normal external genitalia without lesions.  Normal hair distribution.  Adequate perineal body, normal urethral meatus.  Vagina moist and well rugated without lesions or discharge.  Cervix pink, without lesions, discharge or tenderness.  No significant cystocele or rectocele.  Bimanual exam shows uterus to be normal size, regular, mobile and " nontender.  Adnexa without masses or tenderness.      Assessment:     1. Breast cancer screening by mammogram  Mammo Digital Screening Bilat w/ Altaf      2. Encounter for annual routine gynecological examination              Plan:     Clinical breast exam performed.  Pap UTD.  Mammogram ordered.  DEXA not indicated.  Colonoscopy UTD.  Contraception: menopause.  Follow up in about 1 year (around 3/30/2024) for annual well woman exam or as needed.    Counseling:     Patient was counseled today on current ASCCP pap guidelines, the recommendation for yearly pelvic exams, healthy diet and exercise routines, breast self awareness and annual mammograms.She is to see her PCP for other health maintenance.     Use of the [employer] Patient Portal discussed and encouraged during today's visit.         Anita Moran MD  [facility]  03/30/2023

## 2023-05-23 ENCOUNTER — HOSPITAL ENCOUNTER (OUTPATIENT)
Dept: RADIOLOGY | Facility: HOSPITAL | Age: 53
Discharge: HOME | End: 2023-05-23
Attending: OBSTETRICS & GYNECOLOGY
Payer: COMMERCIAL

## 2023-05-23 DIAGNOSIS — Z12.31 BREAST CANCER SCREENING BY MAMMOGRAM: ICD-10-CM

## 2023-05-23 PROCEDURE — 77067 SCR MAMMO BI INCL CAD: CPT | Mod: TC,PO

## 2023-05-23 PROCEDURE — 77067 SCR MAMMO BI INCL CAD: CPT | Mod: 26,,, | Performed by: RADIOLOGY

## 2023-05-23 PROCEDURE — 77067 MAMMO DIGITAL SCREENING BILAT WITH TOMO: ICD-10-PCS | Mod: 26,,, | Performed by: RADIOLOGY

## 2023-05-23 PROCEDURE — 77063 BREAST TOMOSYNTHESIS BI: CPT | Mod: 26,,, | Performed by: RADIOLOGY

## 2023-05-23 PROCEDURE — 77063 MAMMO DIGITAL SCREENING BILAT WITH TOMO: ICD-10-PCS | Mod: 26,,, | Performed by: RADIOLOGY

## 2024-04-26 ENCOUNTER — OFFICE VISIT (OUTPATIENT)
Dept: OBSTETRICS AND GYNECOLOGY | Facility: CLINIC | Age: 54
End: 2024-04-26
Payer: COMMERCIAL

## 2024-04-26 VITALS
DIASTOLIC BLOOD PRESSURE: 86 MMHG | HEIGHT: 62 IN | WEIGHT: 183 LBS | SYSTOLIC BLOOD PRESSURE: 132 MMHG | BODY MASS INDEX: 33.68 KG/M2

## 2024-04-26 DIAGNOSIS — N95.0 PMB (POSTMENOPAUSAL BLEEDING): ICD-10-CM

## 2024-04-26 DIAGNOSIS — Z12.31 SCREENING MAMMOGRAM FOR BREAST CANCER: ICD-10-CM

## 2024-04-26 DIAGNOSIS — Z01.419 ENCOUNTER FOR ANNUAL ROUTINE GYNECOLOGICAL EXAMINATION: Primary | ICD-10-CM

## 2024-04-26 DIAGNOSIS — Z12.4 CERVICAL CANCER SCREENING: ICD-10-CM

## 2024-04-26 DIAGNOSIS — N83.209 SIMPLE OVARIAN CYST: ICD-10-CM

## 2024-04-26 PROCEDURE — 99396 PREV VISIT EST AGE 40-64: CPT | Mod: S$GLB,,, | Performed by: OBSTETRICS & GYNECOLOGY

## 2024-04-26 PROCEDURE — 99999 PR PBB SHADOW E&M-EST. PATIENT-LVL IV: CPT | Mod: PBBFAC,,, | Performed by: OBSTETRICS & GYNECOLOGY

## 2024-04-26 PROCEDURE — 88175 CYTOPATH C/V AUTO FLUID REDO: CPT | Performed by: PATHOLOGY

## 2024-04-26 PROCEDURE — 87624 HPV HI-RISK TYP POOLED RSLT: CPT | Performed by: OBSTETRICS & GYNECOLOGY

## 2024-04-26 PROCEDURE — 88141 CYTOPATH C/V INTERPRET: CPT | Mod: ,,, | Performed by: PATHOLOGY

## 2024-04-26 RX ORDER — DAPAGLIFLOZIN 10 MG/1
10 TABLET, FILM COATED ORAL DAILY
COMMUNITY

## 2024-04-26 RX ORDER — AMLODIPINE BESYLATE 2.5 MG/1
2.5 TABLET ORAL
COMMUNITY
Start: 2024-01-17

## 2024-04-26 NOTE — PROGRESS NOTES
Chief Complaint: Well Woman Exam     HPI:      Alcira Craft is a 53 y.o.  who presents for annual exam. She is currently complaining of  4-5 days of PMB that occurred 2.5 years after cessation of menses . Notes PMS symptoms week prior and mild dysmenorrhea during bleeding.    Ms. Craft is currently sexually active with a single male partner. She declines STD screening today. Patient does not have regular monthly menses. Patient's last menstrual period was 2024. She is currently using no method for contraception.    Previous Pap:  no abnormalities (2024)  Previous Mammogram:   Results for orders placed during the hospital encounter of 23    Mammo Digital Screening Bilat w/ Altaf    Narrative  Result:  Mammo Digital Screening Bilat w/ Altaf    History:  Patient is 52 y.o. and is seen for a screening mammogram.      Films Compared:  Prior images (if available) were compared.    Findings:  This procedure was performed using tomosynthesis.  Computer-aided detection was utilized in the interpretation of this examination.    The breasts have scattered areas of fibroglandular density. There is no evidence of suspicious masses, microcalcifications or architectural distortion.    Impression  No mammographic evidence of malignancy.    BI-RADS Category 1: Negative    Recommendation:  Routine screening mammogram in 1 year is recommended.    Your estimated lifetime risk of breast cancer (to age 85) based on Tyrer-Cuzick risk assessment model is 5.82 %.  According to the American Cancer Society, patients with a lifetime breast cancer risk of 20% or higher might benefit from supplemental screening tests. ??     Most Recent Dexa: not indicated  Colonoscopy: , repeat 10 years    COVID vaccine: completed series  Gardasil:Has never had     Patient Active Problem List   Diagnosis    Essential hypertension    Iron deficiency anemia secondary to inadequate dietary iron intake    Hypothyroidism     "Increased body mass index    Mitral valve disorder    Mixed hyperlipidemia    Supraventricular tachycardia    Type 2 diabetes mellitus    Obesity (BMI 30.0-34.9)    PMB (postmenopausal bleeding)    Simple ovarian cyst       Past Medical History:   Diagnosis Date    Anemia     Diabetes mellitus     History of laparoscopic adjustable gastric banding 2021    Hyperlipidemia     Hypertension     Supraventricular tachycardia 2021       Past Surgical History:   Procedure Laterality Date     SECTION      x3    LAPAROSCOPIC GASTRIC BANDING      27 yrs ago     LAPAROSCOPIC REMOVAL OF GASTRIC BAND  2022       OB History          4    Para   3    Term   3            AB   1    Living   3         SAB   1    IAB        Ectopic        Multiple        Live Births   3           Obstetric Comments   -  No abnormal pap  No STDs  CD x 3  Menopause at 50               ROS:     Review of Systems   Constitutional:  Negative for activity change, fatigue and unexpected weight change.   Respiratory:  Negative for shortness of breath.    Cardiovascular:  Negative for chest pain.   Gastrointestinal:  Negative for abdominal pain, blood in stool, constipation and diarrhea.   Endocrine: Negative for cold intolerance and heat intolerance.   Genitourinary:  Negative for bladder incontinence, dyspareunia, dysuria, frequency, hot flashes, vaginal bleeding and vaginal dryness.   Integumentary:  Negative for breast mass, breast discharge and breast tenderness.   Psychiatric/Behavioral:  Negative for dysphoric mood. The patient is not nervous/anxious.    Breast: Negative for mass and tenderness      Physical Exam:      PHYSICAL EXAM:  /86   Ht 5' 2" (1.575 m)   Wt 83 kg (182 lb 15.7 oz)   LMP 2024   BMI 33.47 kg/m²   Body mass index is 33.47 kg/m².     APPEARANCE: Well nourished, well developed, in no acute distress.  PSYCH: Appropriate mood and affect.  SKIN: No acne or hirsutism  NECK: Neck " symmetric without masses or thyromegaly  NODES: No inguinal, axillary, or supraclavicular lymph node enlargement  CHEST: Normal respiratory effort.  ABDOMEN: Soft.  No tenderness or masses.   BREASTS: Symmetrical, no skin changes or visible lesions.  No palpable masses or nipple discharge bilaterally.  PELVIC: Normal external genitalia without lesions.  Normal hair distribution.  Adequate perineal body, normal urethral meatus.  Vagina moist and well rugated without lesions or discharge.  Cervix pink, without lesions, discharge or tenderness.  No significant cystocele or rectocele.  Bimanual exam shows uterus to be normal size, regular, mobile and nontender.  Adnexa without masses or tenderness.    EXTREMITIES: No edema.    Pelvic US:  EXAMINATION:  US PELVIS COMP WITH TRANSVAG NON-OB (XPD)     CLINICAL HISTORY:  Postmenopausal bleeding     TECHNIQUE:  Transabdominal sonography of the pelvis was performed, followed by transvaginal sonography to better evaluate the uterus and ovaries.     FINDINGS:  The uterus measures 8.4 x 3.7 x 4.3 cm and has a homogeneous echotexture.  The endometrial stripe measures 0.4 cm.  The right ovary is not visualized.  The left ovary measures 2.4 x 1.6 x 2.2 cm.  There is a 2.3 cm simple appearing cyst within the left ovary.     Impression:     Simple appearing cyst within the left ovary.        Electronically signed by:Marv Torres MD  Date:                                            04/02/2024  Time:                                           15:47           Exam Ended: 04/01/24 15:51 CDT Last Resulted: 04/02/24 15:47 CDT           Assessment:     1. Encounter for annual routine gynecological examination        2. Cervical cancer screening  Liquid-Based Pap Smear, Screening    HPV High Risk Genotypes, PCR      3. Screening mammogram for breast cancer  Mammo Digital Screening Bilat w/ Altaf      4. PMB (postmenopausal bleeding)        5. Simple ovarian cyst  US Pelvis Comp with Transvag  NON-OB (xpd            Plan:     Clinical breast exam performed.  Pap collected.  Mammogram ordered.  DEXA at 63.  Colonoscopy UTD.  Contraception: menopause.  PMB: US with normal EMS, notify clinic for recurrent VB.   Simple ovarian cyst: repeat in 3 months, if persistent repeat annually.  Follow up in about 1 year (around 4/26/2025) for annual well woman exam or as needed.    Counseling:     Patient was counseled today on current ASCCP pap guidelines, the recommendation for yearly pelvic exams, healthy diet and exercise routines, breast self awareness and annual mammograms. She is to see her PCP for other health maintenance.       Use of the [employer] Patient Portal discussed and encouraged during today's visit.         Anita Moran MD  [facility]  04/26/2024

## 2024-05-03 ENCOUNTER — TELEPHONE (OUTPATIENT)
Dept: OBSTETRICS AND GYNECOLOGY | Facility: CLINIC | Age: 54
End: 2024-05-03
Payer: COMMERCIAL

## 2024-05-03 ENCOUNTER — PATIENT MESSAGE (OUTPATIENT)
Dept: OBSTETRICS AND GYNECOLOGY | Facility: CLINIC | Age: 54
End: 2024-05-03
Payer: COMMERCIAL

## 2024-05-03 LAB
FINAL PATHOLOGIC DIAGNOSIS: ABNORMAL
HPV HR 12 DNA SPEC QL NAA+PROBE: NEGATIVE
HPV16 AG SPEC QL: NEGATIVE
HPV18 DNA SPEC QL NAA+PROBE: NEGATIVE
Lab: ABNORMAL

## 2024-05-03 NOTE — TELEPHONE ENCOUNTER
Called to answer patient questions about pap results. I let patient know we do not have the full picture until the hpv results are in. Answered all of patients questions. Estimated results will be in 7-14 days. Patient expressed understanding.

## 2024-05-03 NOTE — TELEPHONE ENCOUNTER
----- Message from Alejandra Parada sent at 5/3/2024  9:09 AM CDT -----  Regarding: results  Type:  Test Results    Who Called: pt  Name of Test (Lab/Mammo/Etc): Pap Smear  Date of Test:   Ordering Provider: Dean   Where the test was performed: [facility]  Would the patient rather a call back or a response via [facility]? Call  Best Call Back Number: [phone]  Additional Information:

## 2024-05-03 NOTE — TELEPHONE ENCOUNTER
Returned patient phone call. Pt states she would like to speak with someone about pap results. Provider currently out of the office. Offered to send message to provider for the doctor to call the patient once she has reviewed the results. . Pt declined states she would like to speak with someone now and follow up with her provider later . Provider on staff contacted. Patient expressed understanding.

## 2024-07-12 ENCOUNTER — HOSPITAL ENCOUNTER (OUTPATIENT)
Dept: RADIOLOGY | Facility: HOSPITAL | Age: 54
Discharge: HOME | End: 2024-07-12
Attending: OBSTETRICS & GYNECOLOGY
Payer: COMMERCIAL

## 2024-07-12 DIAGNOSIS — Z12.31 SCREENING MAMMOGRAM FOR BREAST CANCER: ICD-10-CM

## 2024-07-12 DIAGNOSIS — N83.209 SIMPLE OVARIAN CYST: ICD-10-CM

## 2024-07-12 PROCEDURE — 77067 SCR MAMMO BI INCL CAD: CPT | Mod: 26,,, | Performed by: RADIOLOGY

## 2024-07-12 PROCEDURE — 77067 SCR MAMMO BI INCL CAD: CPT | Mod: TC,PN

## 2024-07-12 PROCEDURE — 76856 US EXAM PELVIC COMPLETE: CPT | Mod: TC,PN

## 2024-07-12 PROCEDURE — 76856 US EXAM PELVIC COMPLETE: CPT | Mod: 26,,, | Performed by: RADIOLOGY

## 2024-07-12 PROCEDURE — 76830 TRANSVAGINAL US NON-OB: CPT | Mod: TC,PN

## 2024-07-12 PROCEDURE — 76830 TRANSVAGINAL US NON-OB: CPT | Mod: 26,,, | Performed by: RADIOLOGY

## 2024-07-12 PROCEDURE — 77063 BREAST TOMOSYNTHESIS BI: CPT | Mod: 26,,, | Performed by: RADIOLOGY

## 2024-07-12 PROCEDURE — 77063 BREAST TOMOSYNTHESIS BI: CPT | Mod: TC,PN

## 2025-05-22 ENCOUNTER — OFFICE VISIT (OUTPATIENT)
Dept: OBSTETRICS AND GYNECOLOGY | Facility: CLINIC | Age: 55
End: 2025-05-22
Payer: COMMERCIAL

## 2025-05-22 VITALS
HEIGHT: 62 IN | WEIGHT: 180.13 LBS | DIASTOLIC BLOOD PRESSURE: 76 MMHG | SYSTOLIC BLOOD PRESSURE: 124 MMHG | BODY MASS INDEX: 33.15 KG/M2

## 2025-05-22 DIAGNOSIS — Z29.89 OSTEOPOROSIS PROPHYLAXIS: ICD-10-CM

## 2025-05-22 DIAGNOSIS — Z12.31 BREAST CANCER SCREENING BY MAMMOGRAM: ICD-10-CM

## 2025-05-22 DIAGNOSIS — Z01.419 ENCOUNTER FOR ANNUAL ROUTINE GYNECOLOGICAL EXAMINATION: Primary | ICD-10-CM

## 2025-05-22 PROCEDURE — 99396 PREV VISIT EST AGE 40-64: CPT | Mod: S$GLB,,, | Performed by: OBSTETRICS & GYNECOLOGY

## 2025-05-22 PROCEDURE — 99999 PR PBB SHADOW E&M-EST. PATIENT-LVL III: CPT | Mod: PBBFAC,,, | Performed by: OBSTETRICS & GYNECOLOGY

## 2025-05-22 RX ORDER — DAPAGLIFLOZIN 10 MG/1
10 TABLET, FILM COATED ORAL
COMMUNITY

## 2025-05-22 RX ORDER — ASPIRIN 81 MG/1
81 TABLET ORAL
COMMUNITY

## 2025-05-22 RX ORDER — ORAL SEMAGLUTIDE 3 MG/1
3 TABLET ORAL
COMMUNITY

## 2025-05-22 RX ORDER — GLIPIZIDE 10 MG/1
10 TABLET, FILM COATED, EXTENDED RELEASE ORAL 2 TIMES DAILY
COMMUNITY

## 2025-05-22 RX ORDER — METFORMIN HYDROCHLORIDE 500 MG/1
1000 TABLET, EXTENDED RELEASE ORAL 2 TIMES DAILY
COMMUNITY

## 2025-05-22 NOTE — PROGRESS NOTES
Chief Complaint: Well Woman Exam     HPI:      Alcira Craft is a 54 y.o.  who presents today for well woman exam.  LMP: Patient's last menstrual period was 2024.  No issues, problems, or complaints. Specifically, patient denies abnormal vaginal bleeding, discharge, pelvic pain, urinary problems, or changes in appetite. Ms. Craft is currently sexually active with a single male partner. She is currently using no method for contraception. She declines STD screening today.    Previous Pap:  ASCUS with NEGATIVE high risk HPV (5/3/2024)  Previous Mammogram:    Results for orders placed during the hospital encounter of 24    Mammo Digital Screening Bilat w/ Altaf    Narrative  Result:  Mammo Digital Screening Bilat w/ Altaf    History:  Patient is 53 y.o. and is seen for screening mammogram for breast cancer.      Films Compared:  Compared to: 2023 Mammo Digital Screening Bilat w/ Altaf, 2022 US Breast Right Limited, 2022 Mammo Digital Diagnostic Bilat with Altaf, 2021 US Breast Bilateral Limited, and 2021 Mammo Digital Diagnostic Bilat with Altaf    Findings:  This procedure was performed using tomosynthesis. Computer-aided detection was utilized in the interpretation of this examination.    There are scattered areas of fibroglandular density. There is no evidence of suspicious masses, calcifications, or other abnormal findings.  Stable benign bilateral breast nodules.    Impression  Bilateral  There is no mammographic evidence of malignancy.    BI-RADS Category:  Overall: 2 - Benign      Recommendation:  Routine screening mammogram in 1 year is recommended.        Your estimated lifetime risk of breast cancer (to age 85) based on Tyrer-Cuzick risk assessment model is 5.72%.  According to the American Cancer Society, patients with a lifetime breast cancer risk of 20% or higher might benefit from supplemental screening tests, such as screening breast MRI.     Most  "Recent Dexa: not indicated        Colonoscopy: , repeat 10 years    COVID vaccine: completed series  Gardasil:has never had     Problem List[1]    Past Medical History:   Diagnosis Date    Anemia     Diabetes mellitus     History of laparoscopic adjustable gastric banding 2021    Hyperlipidemia     Hypertension     Supraventricular tachycardia 2021       Past Surgical History:   Procedure Laterality Date     SECTION      x3    LAPAROSCOPIC GASTRIC BANDING      27 yrs ago     LAPAROSCOPIC REMOVAL OF GASTRIC BAND  2022       OB History          4    Para   3    Term   3            AB   1    Living   3         SAB   1    IAB        Ectopic        Multiple        Live Births   3           Obstetric Comments   -  No abnormal pap  No STDs  CD x 3  Menopause at 50               ROS:     Review of Systems   Constitutional:  Negative for activity change, fatigue and unexpected weight change.   Respiratory:  Negative for shortness of breath.    Cardiovascular:  Negative for chest pain.   Gastrointestinal:  Negative for abdominal pain, blood in stool, constipation and diarrhea.   Endocrine: Negative for cold intolerance and heat intolerance.   Genitourinary:  Negative for bladder incontinence, dyspareunia, dysuria, frequency, hot flashes, vaginal bleeding and vaginal dryness.   Integumentary:  Negative for breast mass, breast discharge and breast tenderness.   Psychiatric/Behavioral:  Negative for dysphoric mood. The patient is not nervous/anxious.    Breast: Negative for mass and tenderness      Physical Exam:      PHYSICAL EXAM:  /76   Ht 5' 2" (1.575 m)   Wt 81.7 kg (180 lb 1.9 oz)   LMP 2024   BMI 32.94 kg/m²   Body mass index is 32.94 kg/m².     APPEARANCE: Well nourished, well developed, in no acute distress.  PSYCH: Appropriate mood and affect.  SKIN: No acne or hirsutism  NECK: Neck symmetric without masses or thyromegaly  NODES: No inguinal, axillary, or " supraclavicular lymph node enlargement  ABDOMEN: Soft.  No tenderness or masses.    CARDIOVASCULAR: No edema of peripheral extremities  BREASTS: Symmetrical, no skin changes or visible lesions.  No palpable masses or nipple discharge bilaterally.  PELVIC: Normal external genitalia without lesions.  Normal hair distribution.  Adequate perineal body, normal urethral meatus.  Vagina moist and well rugated without lesions or discharge.  Cervix pink, without lesions, discharge or tenderness.  No significant cystocele or rectocele.  Bimanual exam shows uterus to be normal size, regular, mobile and nontender.  Adnexa without masses or tenderness.      Assessment:     1. Encounter for annual routine gynecological examination        2. Breast cancer screening by mammogram  Mammo Digital Screening Bilat w/ Altaf (XPD)      3. Osteoporosis prophylaxis              Plan:     Clinical breast exam performed.  Pap UTD.  Mammogram ordered.  DEXA at 63, FRAX reviewed. Discussed recommendation for Ca, Vitamin D, weight bearing exercise, and balance training.   Colonoscopy UTD.  Contraception: menopause.   Follow up in about 1 year (around 5/22/2026) for annual well woman exam or as needed.    Counseling:     Patient was counseled today on current ASCCP pap guidelines, the recommendation for yearly pelvic exams, healthy diet and exercise routines, breast self awareness and annual mammograms.She is to see her PCP for other health maintenance.     Use of the [employer] Patient Portal discussed and encouraged during today's visit.         Anita Moran MD  [facility]  05/22/2025               [1]   Patient Active Problem List  Diagnosis    Essential hypertension    Iron deficiency anemia secondary to inadequate dietary iron intake    Hypothyroidism    Increased body mass index    Mitral valve disorder    Mixed hyperlipidemia    Supraventricular tachycardia    Type 2 diabetes mellitus    Obesity (BMI 30.0-34.9)     PMB (postmenopausal bleeding)    Simple ovarian cyst

## 2025-07-14 ENCOUNTER — HOSPITAL ENCOUNTER (OUTPATIENT)
Dept: RADIOLOGY | Facility: HOSPITAL | Age: 55
Discharge: HOME | End: 2025-07-14
Attending: OBSTETRICS & GYNECOLOGY
Payer: COMMERCIAL

## 2025-07-14 DIAGNOSIS — Z12.31 BREAST CANCER SCREENING BY MAMMOGRAM: ICD-10-CM

## 2025-07-14 PROCEDURE — 77067 SCR MAMMO BI INCL CAD: CPT | Mod: 26,,, | Performed by: RADIOLOGY

## 2025-07-14 PROCEDURE — 77063 BREAST TOMOSYNTHESIS BI: CPT | Mod: 26,,, | Performed by: RADIOLOGY

## 2025-07-14 PROCEDURE — 77067 SCR MAMMO BI INCL CAD: CPT | Mod: TC,PN
